# Patient Record
Sex: FEMALE | Race: WHITE | NOT HISPANIC OR LATINO | URBAN - METROPOLITAN AREA
[De-identification: names, ages, dates, MRNs, and addresses within clinical notes are randomized per-mention and may not be internally consistent; named-entity substitution may affect disease eponyms.]

---

## 2021-02-22 ENCOUNTER — LAB OUTSIDE AN ENCOUNTER (OUTPATIENT)
Dept: URBAN - METROPOLITAN AREA CLINIC 70 | Facility: CLINIC | Age: 41
End: 2021-02-22

## 2021-02-22 ENCOUNTER — OFFICE VISIT (OUTPATIENT)
Dept: URBAN - METROPOLITAN AREA CLINIC 70 | Facility: CLINIC | Age: 41
End: 2021-02-22
Payer: MEDICAID

## 2021-02-22 DIAGNOSIS — K58.1 IRRITABLE BOWEL SYNDROME WITH CONSTIPATION: ICD-10-CM

## 2021-02-22 DIAGNOSIS — K50.90 CROHN'S DISEASE, UNSPECIFIED, WITHOUT COMPLICATIONS: ICD-10-CM

## 2021-02-22 DIAGNOSIS — R10.84 GENERALIZED ABDOMINAL PAIN: ICD-10-CM

## 2021-02-22 PROCEDURE — 99203 OFFICE O/P NEW LOW 30 MIN: CPT | Performed by: REGISTERED NURSE

## 2021-02-22 RX ORDER — LISINOPRIL 10 MG/1
TABLET ORAL
Qty: 0 | Refills: 0 | Status: DISCONTINUED | COMMUNITY
Start: 2016-09-09

## 2021-02-22 RX ORDER — NORTRIPTYLINE HYDROCHLORIDE 25 MG/1
TAKE 2 CAPSULES (50 MG) BY ORAL ROUTE ONCE DAILY AT BEDTIME CAPSULE ORAL 1
Qty: 0 | Refills: 0 | Status: DISCONTINUED | COMMUNITY
Start: 1900-01-01

## 2021-02-22 RX ORDER — CLONAZEPAM 0.5 MG/1
1 PO QD TABLET ORAL
Qty: 0 | Refills: 0 | Status: DISCONTINUED | COMMUNITY
Start: 1900-01-01

## 2021-02-22 RX ORDER — CHLORHEXIDINE GLUCONATE 4 %
LIQUID (ML) TOPICAL
Qty: 0 | Refills: 0 | Status: DISCONTINUED | COMMUNITY
Start: 1900-01-01

## 2021-02-22 RX ORDER — ALPRAZOLAM 0.5 MG/1
1 TABLET TABLET ORAL TWICE A DAY
Status: ACTIVE | COMMUNITY

## 2021-02-22 RX ORDER — MECLIZINE HYDROCHLORIDE 25 MG/1
TABLET ORAL
Qty: 0 | Refills: 0 | Status: ACTIVE | COMMUNITY
Start: 2016-07-21

## 2021-02-22 RX ORDER — AMLODIPINE BESYLATE 5 MG/1
1 TABLET TABLET ORAL ONCE A DAY
Status: ACTIVE | COMMUNITY

## 2021-02-22 RX ORDER — VENLAFAXINE HYDROCHLORIDE 225 MG/1
1 CAPSULE WITH FOOD TABLET ORAL ONCE A DAY
Refills: 0 | Status: ACTIVE | COMMUNITY
Start: 1900-01-01

## 2021-02-22 RX ORDER — MONTELUKAST SODIUM 10 MG/1
1 TABLET TABLET, FILM COATED ORAL ONCE A DAY
Status: ACTIVE | COMMUNITY

## 2021-02-22 RX ORDER — CETIRIZINE HYDROCHLORIDE 10 MG/1
1 TABLET TABLET, FILM COATED ORAL ONCE A DAY
Status: ACTIVE | COMMUNITY

## 2021-02-22 RX ORDER — GABAPENTIN 300 MG/1
TAKE 1 CAPSULE (300 MG) BY ORAL ROUTE 2 TIMES PER DAY CAPSULE ORAL
Qty: 0 | Refills: 0 | Status: DISCONTINUED | COMMUNITY
Start: 1900-01-01

## 2021-02-22 RX ORDER — PROMETHAZINE HYDROCHLORIDE 25 MG/1
1 TABLET AS NEEDED TABLET ORAL
Status: ACTIVE | COMMUNITY

## 2021-02-22 RX ORDER — PROPRANOLOL HYDROCHLORIDE 40 MG/1
1 TABLET TABLET ORAL TWICE A DAY
Refills: 0 | Status: DISCONTINUED | COMMUNITY
Start: 1900-01-01

## 2021-02-22 RX ORDER — PREDNISONE 5 MG/1
TAKE 1 TABLET (5 MG) BY ORAL ROUTE ONCE DAILY TABLET ORAL 1
Qty: 0 | Refills: 0 | Status: DISCONTINUED | COMMUNITY
Start: 1900-01-01

## 2021-02-22 RX ORDER — TRAMADOL HYDROCHLORIDE 50 MG/1
1 TABLET AS NEEDED TABLET, FILM COATED ORAL ONCE A DAY
Status: ACTIVE | COMMUNITY

## 2021-02-22 RX ORDER — LUBIPROSTONE 8 UG/1
1 CAPSULE WITH FOOD AND WATER CAPSULE, GELATIN COATED ORAL TWICE A DAY
OUTPATIENT
Start: 2021-02-22

## 2021-02-22 RX ORDER — SUCRALFATE 1 G/1
TAKE 1 TABLET (1 GRAM) BY ORAL ROUTE 2 TIMES PER DAY ON AN EMPTY STOMACH FOR 30 DAYS TABLET ORAL 2
Qty: 60 | Refills: 4 | Status: DISCONTINUED | COMMUNITY
Start: 2018-01-02

## 2021-02-22 RX ORDER — FERROUS SULFATE 325(65) MG
1 TABLET TABLET ORAL ONCE A DAY
Status: ACTIVE | COMMUNITY

## 2021-02-22 RX ORDER — HYDROCHLOROTHIAZIDE 25 MG/1
1 CAPSULE IN THE MORNING TABLET ORAL ONCE A DAY
Refills: 0 | Status: ACTIVE | COMMUNITY
Start: 2016-09-09

## 2021-02-22 RX ORDER — PRAVASTATIN SODIUM 20 MG/1
1 TABLET TABLET ORAL ONCE A DAY
Status: ACTIVE | COMMUNITY

## 2021-02-22 RX ORDER — ROPINIROLE 4 MG/1
1 TABLET 1 TO 3 HOURS BEFORE BEDTIME TABLET, FILM COATED ORAL ONCE A DAY
Status: ACTIVE | COMMUNITY

## 2021-02-22 RX ORDER — BUDESONIDE 3 MG/1
TAKE 3 CAPSULES (9 MG) BY ORAL ROUTE ONCE DAILY IN THE MORNING CAPSULE ORAL 1
Qty: 90 | Refills: 3 | Status: DISCONTINUED | COMMUNITY
Start: 2018-01-02

## 2021-02-22 RX ORDER — POTASSIUM CHLORIDE 14.9 G/1000ML
AS DIRECTED INJECTION, SOLUTION INTRAVENOUS
Status: DISCONTINUED | COMMUNITY

## 2021-02-22 RX ORDER — BUTALBITAL, ACETAMINOPHEN, AND CAFFEINE 50; 300; 40 MG/1; MG/1; MG/1
1 CAPSULE AS NEEDED CAPSULE ORAL
Status: ACTIVE | COMMUNITY

## 2021-02-22 RX ORDER — MIRTAZAPINE 15 MG/1
1 TABLET AT BEDTIME TABLET, FILM COATED ORAL ONCE A DAY
Status: ACTIVE | COMMUNITY

## 2021-02-22 RX ORDER — TIZANIDINE HYDROCHLORIDE 4 MG/1
1-2 PO QHS CAPSULE ORAL
Qty: 0 | Refills: 0 | Status: ACTIVE | COMMUNITY
Start: 1900-01-01

## 2021-02-22 RX ORDER — DICYCLOMINE HYDROCHLORIDE 20 MG/1
1 TABLET TABLET ORAL THREE TIMES A DAY
Status: ACTIVE | COMMUNITY

## 2021-02-22 RX ORDER — PHENAZOPYRIDINE HYDROCHLORIDE 100 MG/1
TAKE 1 CAPSULE (60 MG) BY ORAL ROUTE ONCE DAILY TABLET, COATED ORAL 1
Qty: 0 | Refills: 0 | Status: DISCONTINUED | COMMUNITY
Start: 1900-01-01

## 2021-02-22 RX ORDER — OMEPRAZOLE 40 MG/1
TAKE 1 CAPSULE (40 MG) BY ORAL ROUTE ONCE DAILY BEFORE A MEAL FOR 60 DAYS CAPSULE, DELAYED RELEASE PELLETS ORAL 1
Qty: 60 | Refills: 3 | Status: ACTIVE | COMMUNITY
Start: 2018-01-02

## 2021-02-22 NOTE — HPI-TODAY'S VISIT:
Pt was diagnosed with Crohn's in 2016. She had mild inflammation in the transverse colon. IBD panel indicated Crohn's. Since that time, she was treated with Apriso, Humira, and Entocort. She has continued to c/o alternating constipation and diarrhea along with abdominal pain and cramping. Repeat colonoscopy in 2017 showed normal colon mucosa and normal TI. She was last seen by us in 2017. She was seen at [place] after her insurance changed and we were not on her plan. She reports that they did another colonoscopy and a small bowel pill cam and found no signs of inflammation. They stopped the Humira.  She presents today with c/o constipation alternating with diarrhea. She will go 2-3 weeks with no BM and then have severe diarrhea. She will end up taking Immodium. She c/o generalized abdominal pain, cramping, and nausea. Has tried Miralax in the past with minimal improvement. Linzess caused severe diarrhea.

## 2021-03-15 ENCOUNTER — WEB ENCOUNTER (OUTPATIENT)
Dept: URBAN - METROPOLITAN AREA CLINIC 70 | Facility: CLINIC | Age: 41
End: 2021-03-15

## 2021-03-15 ENCOUNTER — OFFICE VISIT (OUTPATIENT)
Dept: URBAN - METROPOLITAN AREA CLINIC 70 | Facility: CLINIC | Age: 41
End: 2021-03-15
Payer: MEDICAID

## 2021-03-15 VITALS
HEART RATE: 77 BPM | HEIGHT: 64 IN | WEIGHT: 238 LBS | TEMPERATURE: 97.3 F | SYSTOLIC BLOOD PRESSURE: 126 MMHG | DIASTOLIC BLOOD PRESSURE: 89 MMHG | BODY MASS INDEX: 40.63 KG/M2

## 2021-03-15 DIAGNOSIS — R10.84 GENERALIZED ABDOMINAL PAIN: ICD-10-CM

## 2021-03-15 DIAGNOSIS — K50.90 CROHN'S DISEASE, UNSPECIFIED, WITHOUT COMPLICATIONS: ICD-10-CM

## 2021-03-15 DIAGNOSIS — K59.04 CHRONIC IDIOPATHIC CONSTIPATION: ICD-10-CM

## 2021-03-15 PROBLEM — 34000006 CROHN'S DISEASE: Status: ACTIVE | Noted: 2021-02-22

## 2021-03-15 LAB
A/G RATIO: 1.3
ALBUMIN: 3.8
ALKALINE PHOSPHATASE: 147
ALT (SGPT): 9
AST (SGOT): 10
BILIRUBIN, TOTAL: 0.2
BUN/CREATININE RATIO: 16
BUN: 15
CALCIUM: 8.8
CALPROTECTIN, FECAL: 46
CARBON DIOXIDE, TOTAL: 24
CHLORIDE: 103
CREATININE: 0.93
EGFR IF AFRICN AM: 88
EGFR IF NONAFRICN AM: 76
GLOBULIN, TOTAL: 2.9
GLUCOSE: 94
HEMATOCRIT: 42.3
HEMOGLOBIN: 13.9
MCH: 29.6
MCHC: 32.9
MCV: 90
NRBC: (no result)
PLATELETS: 361
POTASSIUM: 3.8
PROTEIN, TOTAL: 6.7
RBC: 4.69
RDW: 13.5
SODIUM: 141
WBC: 15.5

## 2021-03-15 PROCEDURE — 99214 OFFICE O/P EST MOD 30 MIN: CPT | Performed by: REGISTERED NURSE

## 2021-03-15 RX ORDER — LUBIPROSTONE 24 UG/1
1 CAPSULE WITH FOOD AND WATER CAPSULE, GELATIN COATED ORAL TWICE A DAY
Qty: 60 | Refills: 11 | OUTPATIENT
Start: 2021-03-15 | End: 2022-03-09

## 2021-03-15 RX ORDER — CETIRIZINE HYDROCHLORIDE 10 MG/1
1 TABLET TABLET, FILM COATED ORAL ONCE A DAY
Status: ACTIVE | COMMUNITY

## 2021-03-15 RX ORDER — OMEPRAZOLE 40 MG/1
TAKE 1 CAPSULE (40 MG) BY ORAL ROUTE ONCE DAILY BEFORE A MEAL FOR 60 DAYS CAPSULE, DELAYED RELEASE PELLETS ORAL 1
Qty: 60 | Refills: 3 | Status: ACTIVE | COMMUNITY
Start: 2018-01-02

## 2021-03-15 RX ORDER — AMLODIPINE BESYLATE 5 MG/1
1 TABLET TABLET ORAL ONCE A DAY
Status: ACTIVE | COMMUNITY

## 2021-03-15 RX ORDER — LUBIPROSTONE 8 UG/1
1 CAPSULE WITH FOOD AND WATER CAPSULE, GELATIN COATED ORAL TWICE A DAY
Status: ACTIVE | COMMUNITY
Start: 2021-02-22

## 2021-03-15 RX ORDER — MECLIZINE HYDROCHLORIDE 25 MG/1
TABLET ORAL
Qty: 0 | Refills: 0 | Status: ACTIVE | COMMUNITY
Start: 2016-07-21

## 2021-03-15 RX ORDER — TRAMADOL HYDROCHLORIDE 50 MG/1
1 TABLET AS NEEDED TABLET, FILM COATED ORAL ONCE A DAY
Status: ACTIVE | COMMUNITY

## 2021-03-15 RX ORDER — ALPRAZOLAM 0.5 MG/1
1 TABLET TABLET ORAL TWICE A DAY
Status: ACTIVE | COMMUNITY

## 2021-03-15 RX ORDER — MIRTAZAPINE 15 MG/1
1 TABLET AT BEDTIME TABLET, FILM COATED ORAL ONCE A DAY
Status: ACTIVE | COMMUNITY

## 2021-03-15 RX ORDER — HYDROCHLOROTHIAZIDE 25 MG/1
1 CAPSULE IN THE MORNING TABLET ORAL ONCE A DAY
Refills: 0 | Status: ACTIVE | COMMUNITY
Start: 2016-09-09

## 2021-03-15 RX ORDER — MONTELUKAST SODIUM 10 MG/1
1 TABLET TABLET, FILM COATED ORAL ONCE A DAY
Status: ACTIVE | COMMUNITY

## 2021-03-15 RX ORDER — DICYCLOMINE HYDROCHLORIDE 20 MG/1
1 TABLET TABLET ORAL THREE TIMES A DAY
Status: ACTIVE | COMMUNITY

## 2021-03-15 RX ORDER — LUBIPROSTONE 8 UG/1
1 CAPSULE WITH FOOD AND WATER CAPSULE, GELATIN COATED ORAL TWICE A DAY
OUTPATIENT
Start: 2021-02-22

## 2021-03-15 RX ORDER — PRAVASTATIN SODIUM 20 MG/1
1 TABLET TABLET ORAL ONCE A DAY
Status: ACTIVE | COMMUNITY

## 2021-03-15 RX ORDER — TIZANIDINE HYDROCHLORIDE 4 MG/1
1-2 PO QHS CAPSULE ORAL
Qty: 0 | Refills: 0 | Status: ACTIVE | COMMUNITY
Start: 1900-01-01

## 2021-03-15 RX ORDER — FERROUS SULFATE 325(65) MG
1 TABLET TABLET ORAL ONCE A DAY
Status: ACTIVE | COMMUNITY

## 2021-03-15 RX ORDER — ROPINIROLE 4 MG/1
1 TABLET 1 TO 3 HOURS BEFORE BEDTIME TABLET, FILM COATED ORAL ONCE A DAY
Status: ACTIVE | COMMUNITY

## 2021-03-15 RX ORDER — PROMETHAZINE HYDROCHLORIDE 25 MG/1
1 TABLET AS NEEDED TABLET ORAL
Status: ACTIVE | COMMUNITY

## 2021-03-15 RX ORDER — VENLAFAXINE HYDROCHLORIDE 225 MG/1
1 CAPSULE WITH FOOD TABLET ORAL ONCE A DAY
Refills: 0 | Status: ACTIVE | COMMUNITY
Start: 1900-01-01

## 2021-03-15 RX ORDER — BUTALBITAL, ACETAMINOPHEN, AND CAFFEINE 50; 300; 40 MG/1; MG/1; MG/1
1 CAPSULE AS NEEDED CAPSULE ORAL
Status: ACTIVE | COMMUNITY

## 2021-03-15 NOTE — HPI-TODAY'S VISIT:
Note from OV 2/22/21: Pt was diagnosed with Crohn's in 2016. She had mild inflammation in the transverse colon. IBD panel indicated Crohn's. Since that time, she was treated with Apriso, Humira, and Entocort. She has continued to c/o alternating constipation and diarrhea along with abdominal pain and cramping. Repeat colonoscopy in 2017 showed normal colon mucosa and normal TI. She was last seen by us in 2017. She was seen at [place] after her insurance changed and we were not on her plan. She reports that they did another colonoscopy and a small bowel pill cam and found no signs of inflammation. They stopped the Humira.  She presents today with c/o constipation alternating with diarrhea. She will go 2-3 weeks with no BM and then have severe diarrhea. She will end up taking Immodium. She c/o generalized abdominal pain, cramping, and nausea. Has tried Miralax in the past with minimal improvement. Linzess caused severe diarrhea. TODAY: She was prescribed Amitiza at last visit. Labs, stool studies and CT scan were ordered. Labs and stools study results are not available yet. CT scan showed no evidence of colitis. It does show a large ovarian cyst. Bowels are moving every 3-4 days with the Amitiza. We did not receive her records from [place].

## 2021-04-26 ENCOUNTER — OFFICE VISIT (OUTPATIENT)
Dept: URBAN - METROPOLITAN AREA CLINIC 70 | Facility: CLINIC | Age: 41
End: 2021-04-26

## 2021-04-26 RX ORDER — PROMETHAZINE HYDROCHLORIDE 25 MG/1
1 TABLET AS NEEDED TABLET ORAL
Status: ACTIVE | COMMUNITY

## 2021-04-26 RX ORDER — TIZANIDINE HYDROCHLORIDE 4 MG/1
1-2 PO QHS CAPSULE ORAL
Qty: 0 | Refills: 0 | Status: ACTIVE | COMMUNITY
Start: 1900-01-01

## 2021-04-26 RX ORDER — ROPINIROLE 4 MG/1
1 TABLET 1 TO 3 HOURS BEFORE BEDTIME TABLET, FILM COATED ORAL ONCE A DAY
Status: ACTIVE | COMMUNITY

## 2021-04-26 RX ORDER — BUTALBITAL, ACETAMINOPHEN, AND CAFFEINE 50; 300; 40 MG/1; MG/1; MG/1
1 CAPSULE AS NEEDED CAPSULE ORAL
Status: ACTIVE | COMMUNITY

## 2021-04-26 RX ORDER — VENLAFAXINE HYDROCHLORIDE 225 MG/1
1 CAPSULE WITH FOOD TABLET ORAL ONCE A DAY
Refills: 0 | Status: ACTIVE | COMMUNITY
Start: 1900-01-01

## 2021-04-26 RX ORDER — LUBIPROSTONE 24 UG/1
1 CAPSULE WITH FOOD AND WATER CAPSULE, GELATIN COATED ORAL TWICE A DAY
Qty: 60 | Refills: 11 | Status: ACTIVE | COMMUNITY
Start: 2021-03-15 | End: 2022-03-09

## 2021-04-26 RX ORDER — ALPRAZOLAM 0.5 MG/1
1 TABLET TABLET ORAL TWICE A DAY
Status: ACTIVE | COMMUNITY

## 2021-04-26 RX ORDER — MIRTAZAPINE 15 MG/1
1 TABLET AT BEDTIME TABLET, FILM COATED ORAL ONCE A DAY
Status: ACTIVE | COMMUNITY

## 2021-04-26 RX ORDER — CETIRIZINE HYDROCHLORIDE 10 MG/1
1 TABLET TABLET, FILM COATED ORAL ONCE A DAY
Status: ACTIVE | COMMUNITY

## 2021-04-26 RX ORDER — PRAVASTATIN SODIUM 20 MG/1
1 TABLET TABLET ORAL ONCE A DAY
Status: ACTIVE | COMMUNITY

## 2021-04-26 RX ORDER — FERROUS SULFATE 325(65) MG
1 TABLET TABLET ORAL ONCE A DAY
Status: ACTIVE | COMMUNITY

## 2021-04-26 RX ORDER — MECLIZINE HYDROCHLORIDE 25 MG/1
TABLET ORAL
Qty: 0 | Refills: 0 | Status: ACTIVE | COMMUNITY
Start: 2016-07-21

## 2021-04-26 RX ORDER — TRAMADOL HYDROCHLORIDE 50 MG/1
1 TABLET AS NEEDED TABLET, FILM COATED ORAL ONCE A DAY
Status: ACTIVE | COMMUNITY

## 2021-04-26 RX ORDER — OMEPRAZOLE 40 MG/1
TAKE 1 CAPSULE (40 MG) BY ORAL ROUTE ONCE DAILY BEFORE A MEAL FOR 60 DAYS CAPSULE, DELAYED RELEASE PELLETS ORAL 1
Qty: 60 | Refills: 3 | Status: ACTIVE | COMMUNITY
Start: 2018-01-02

## 2021-04-26 RX ORDER — HYDROCHLOROTHIAZIDE 25 MG/1
1 CAPSULE IN THE MORNING TABLET ORAL ONCE A DAY
Refills: 0 | Status: ACTIVE | COMMUNITY
Start: 2016-09-09

## 2021-04-26 RX ORDER — AMLODIPINE BESYLATE 5 MG/1
1 TABLET TABLET ORAL ONCE A DAY
Status: ACTIVE | COMMUNITY

## 2021-04-26 RX ORDER — DICYCLOMINE HYDROCHLORIDE 20 MG/1
1 TABLET TABLET ORAL THREE TIMES A DAY
Status: ACTIVE | COMMUNITY

## 2021-04-26 RX ORDER — MONTELUKAST SODIUM 10 MG/1
1 TABLET TABLET, FILM COATED ORAL ONCE A DAY
Status: ACTIVE | COMMUNITY

## 2021-05-20 ENCOUNTER — OFFICE VISIT (OUTPATIENT)
Dept: URBAN - METROPOLITAN AREA CLINIC 70 | Facility: CLINIC | Age: 41
End: 2021-05-20
Payer: MEDICAID

## 2021-05-20 ENCOUNTER — LAB OUTSIDE AN ENCOUNTER (OUTPATIENT)
Dept: URBAN - METROPOLITAN AREA CLINIC 70 | Facility: CLINIC | Age: 41
End: 2021-05-20

## 2021-05-20 VITALS
SYSTOLIC BLOOD PRESSURE: 116 MMHG | HEART RATE: 92 BPM | HEIGHT: 64 IN | BODY MASS INDEX: 40.8 KG/M2 | TEMPERATURE: 98.1 F | DIASTOLIC BLOOD PRESSURE: 75 MMHG | WEIGHT: 239 LBS

## 2021-05-20 DIAGNOSIS — R10.84 GENERALIZED ABDOMINAL PAIN: ICD-10-CM

## 2021-05-20 DIAGNOSIS — K58.1 IRRITABLE BOWEL SYNDROME WITH CONSTIPATION: ICD-10-CM

## 2021-05-20 PROCEDURE — 99214 OFFICE O/P EST MOD 30 MIN: CPT | Performed by: INTERNAL MEDICINE

## 2021-05-20 RX ORDER — PROMETHAZINE HYDROCHLORIDE 25 MG/1
1 TABLET AS NEEDED TABLET ORAL
Status: ACTIVE | COMMUNITY

## 2021-05-20 RX ORDER — OMEPRAZOLE 40 MG/1
TAKE 1 CAPSULE (40 MG) BY ORAL ROUTE ONCE DAILY BEFORE A MEAL FOR 60 DAYS CAPSULE, DELAYED RELEASE PELLETS ORAL 1
Qty: 60 | Refills: 3 | Status: ACTIVE | COMMUNITY
Start: 2018-01-02

## 2021-05-20 RX ORDER — HYDROCHLOROTHIAZIDE 25 MG/1
1 CAPSULE IN THE MORNING TABLET ORAL ONCE A DAY
Refills: 0 | Status: ACTIVE | COMMUNITY
Start: 2016-09-09

## 2021-05-20 RX ORDER — MIRTAZAPINE 15 MG/1
1 TABLET AT BEDTIME TABLET, FILM COATED ORAL ONCE A DAY
Status: ACTIVE | COMMUNITY

## 2021-05-20 RX ORDER — TIZANIDINE HYDROCHLORIDE 4 MG/1
1-2 PO QHS CAPSULE ORAL
Qty: 0 | Refills: 0 | Status: ACTIVE | COMMUNITY
Start: 1900-01-01

## 2021-05-20 RX ORDER — MECLIZINE HYDROCHLORIDE 25 MG/1
TABLET ORAL
Qty: 0 | Refills: 0 | Status: ACTIVE | COMMUNITY
Start: 2016-07-21

## 2021-05-20 RX ORDER — AMLODIPINE BESYLATE 5 MG/1
1 TABLET TABLET ORAL ONCE A DAY
Status: ACTIVE | COMMUNITY

## 2021-05-20 RX ORDER — LUBIPROSTONE 24 UG/1
1 CAPSULE WITH FOOD AND WATER CAPSULE, GELATIN COATED ORAL TWICE A DAY
OUTPATIENT
Start: 2021-03-15 | End: 2022-03-09

## 2021-05-20 RX ORDER — TRAMADOL HYDROCHLORIDE 50 MG/1
1 TABLET AS NEEDED TABLET, FILM COATED ORAL ONCE A DAY
Status: ACTIVE | COMMUNITY

## 2021-05-20 RX ORDER — LUBIPROSTONE 24 UG/1
1 CAPSULE WITH FOOD AND WATER CAPSULE, GELATIN COATED ORAL TWICE A DAY
Qty: 60 | Refills: 11 | Status: ACTIVE | COMMUNITY
Start: 2021-03-15 | End: 2022-03-09

## 2021-05-20 RX ORDER — CETIRIZINE HYDROCHLORIDE 10 MG/1
1 TABLET TABLET, FILM COATED ORAL ONCE A DAY
Status: ACTIVE | COMMUNITY

## 2021-05-20 RX ORDER — PLECANATIDE 3 MG/1
1 TABLET TABLET ORAL ONCE A DAY
Qty: 30 | OUTPATIENT
Start: 2021-05-20 | End: 2021-06-19

## 2021-05-20 RX ORDER — VENLAFAXINE HYDROCHLORIDE 225 MG/1
1 CAPSULE WITH FOOD TABLET ORAL ONCE A DAY
Refills: 0 | Status: ACTIVE | COMMUNITY
Start: 1900-01-01

## 2021-05-20 RX ORDER — BUTALBITAL, ACETAMINOPHEN, AND CAFFEINE 50; 300; 40 MG/1; MG/1; MG/1
1 CAPSULE AS NEEDED CAPSULE ORAL
Status: ACTIVE | COMMUNITY

## 2021-05-20 RX ORDER — FERROUS SULFATE 325(65) MG
1 TABLET TABLET ORAL ONCE A DAY
Status: ACTIVE | COMMUNITY

## 2021-05-20 RX ORDER — ROPINIROLE 4 MG/1
1 TABLET 1 TO 3 HOURS BEFORE BEDTIME TABLET, FILM COATED ORAL ONCE A DAY
Status: ACTIVE | COMMUNITY

## 2021-05-20 RX ORDER — MONTELUKAST SODIUM 10 MG/1
1 TABLET TABLET, FILM COATED ORAL ONCE A DAY
Status: ACTIVE | COMMUNITY

## 2021-05-20 RX ORDER — ALPRAZOLAM 0.5 MG/1
1 TABLET TABLET ORAL TWICE A DAY
Status: ACTIVE | COMMUNITY

## 2021-05-20 RX ORDER — DICYCLOMINE HYDROCHLORIDE 20 MG/1
1 TABLET TABLET ORAL THREE TIMES A DAY
Status: ACTIVE | COMMUNITY

## 2021-05-20 RX ORDER — PRAVASTATIN SODIUM 20 MG/1
1 TABLET TABLET ORAL ONCE A DAY
Status: ACTIVE | COMMUNITY

## 2021-05-20 NOTE — HPI-TODAY'S VISIT:
Note from OV 2/22/21: Pt was diagnosed with Crohn's in 2016. She had mild inflammation in the transverse colon. IBD panel indicated Crohn's. Since that time, she was treated with Apriso, Humira, and Entocort. She has continued to c/o alternating constipation and diarrhea along with abdominal pain and cramping. Repeat colonoscopy in 2017 showed normal colon mucosa and normal TI. She was last seen by us in 2017. She was seen at [place] after her insurance changed and we were not on her plan. She reports that they did another colonoscopy and a small bowel pill cam and found no signs of inflammation. They stopped the Humira.  She presents today with c/o constipation alternating with diarrhea. She will go 2-3 weeks with no BM and then have severe diarrhea. She will end up taking Immodium. She c/o generalized abdominal pain, cramping, and nausea. Has tried Miralax in the past with minimal improvement. Linzess caused severe diarrhea. Note from OV 3/15/21: She was prescribed Amitiza at last visit. Labs, stool studies and CT scan were ordered. Labs and stools study results are not available yet. CT scan showed no evidence of colitis. It does show a large ovarian cyst. Bowels are moving every 3-4 days with the Amitiza. We did not receive her records from [place].  TODAY: We received [place] records showing normal colonoscopy and pill cam in 2018. Recent CT was negative except for ovarian cyst. Stool calprotectin normal. She took the Amitiza for a few days and stopped it due to diarrhea. She continues to c/o no BM for 2-3 weeks followed by several days of severe diarrhea.

## 2021-07-19 ENCOUNTER — OFFICE VISIT (OUTPATIENT)
Dept: URBAN - METROPOLITAN AREA CLINIC 70 | Facility: CLINIC | Age: 41
End: 2021-07-19
Payer: MEDICAID

## 2021-07-19 VITALS
WEIGHT: 243 LBS | DIASTOLIC BLOOD PRESSURE: 82 MMHG | HEART RATE: 76 BPM | SYSTOLIC BLOOD PRESSURE: 125 MMHG | HEIGHT: 64 IN | BODY MASS INDEX: 41.48 KG/M2 | TEMPERATURE: 96.7 F

## 2021-07-19 DIAGNOSIS — K58.1 IRRITABLE BOWEL SYNDROME WITH CONSTIPATION: ICD-10-CM

## 2021-07-19 PROCEDURE — 99214 OFFICE O/P EST MOD 30 MIN: CPT | Performed by: REGISTERED NURSE

## 2021-07-19 RX ORDER — PROMETHAZINE HYDROCHLORIDE 25 MG/1
1 TABLET AS NEEDED TABLET ORAL
Status: ACTIVE | COMMUNITY

## 2021-07-19 RX ORDER — MONTELUKAST SODIUM 10 MG/1
1 TABLET TABLET, FILM COATED ORAL ONCE A DAY
Status: ACTIVE | COMMUNITY

## 2021-07-19 RX ORDER — OMEPRAZOLE 40 MG/1
TAKE 1 CAPSULE (40 MG) BY ORAL ROUTE ONCE DAILY BEFORE A MEAL FOR 60 DAYS CAPSULE, DELAYED RELEASE PELLETS ORAL 1
Qty: 60 | Refills: 3 | Status: ACTIVE | COMMUNITY
Start: 2018-01-02

## 2021-07-19 RX ORDER — ALPRAZOLAM 0.5 MG/1
1 TABLET TABLET ORAL TWICE A DAY
Status: ACTIVE | COMMUNITY

## 2021-07-19 RX ORDER — FERROUS SULFATE 325(65) MG
1 TABLET TABLET ORAL ONCE A DAY
Status: ACTIVE | COMMUNITY

## 2021-07-19 RX ORDER — TRAMADOL HYDROCHLORIDE 50 MG/1
1 TABLET AS NEEDED TABLET, FILM COATED ORAL ONCE A DAY
Status: ACTIVE | COMMUNITY

## 2021-07-19 RX ORDER — VENLAFAXINE HYDROCHLORIDE 225 MG/1
1 CAPSULE WITH FOOD TABLET ORAL ONCE A DAY
Refills: 0 | Status: ACTIVE | COMMUNITY
Start: 1900-01-01

## 2021-07-19 RX ORDER — MECLIZINE HYDROCHLORIDE 25 MG/1
TABLET ORAL
Qty: 0 | Refills: 0 | Status: ACTIVE | COMMUNITY
Start: 2016-07-21

## 2021-07-19 RX ORDER — ROPINIROLE 4 MG/1
1 TABLET 1 TO 3 HOURS BEFORE BEDTIME TABLET, FILM COATED ORAL ONCE A DAY
Status: ACTIVE | COMMUNITY

## 2021-07-19 RX ORDER — AMLODIPINE BESYLATE 5 MG/1
1 TABLET TABLET ORAL ONCE A DAY
Status: ACTIVE | COMMUNITY

## 2021-07-19 RX ORDER — DICYCLOMINE HYDROCHLORIDE 20 MG/1
1 TABLET TABLET ORAL THREE TIMES A DAY
Status: ACTIVE | COMMUNITY

## 2021-07-19 RX ORDER — PRAVASTATIN SODIUM 20 MG/1
1 TABLET TABLET ORAL ONCE A DAY
Status: ACTIVE | COMMUNITY

## 2021-07-19 RX ORDER — LACTULOSE 10 G/15ML
30ML SOLUTION ORAL
Qty: 1800 ML | Refills: 3 | OUTPATIENT
Start: 2021-07-19 | End: 2021-11-15

## 2021-07-19 RX ORDER — BUTALBITAL, ACETAMINOPHEN, AND CAFFEINE 50; 300; 40 MG/1; MG/1; MG/1
1 CAPSULE AS NEEDED CAPSULE ORAL
Status: ACTIVE | COMMUNITY

## 2021-07-19 RX ORDER — MIRTAZAPINE 15 MG/1
1 TABLET AT BEDTIME TABLET, FILM COATED ORAL ONCE A DAY
Status: ACTIVE | COMMUNITY

## 2021-07-19 RX ORDER — HYDROCHLOROTHIAZIDE 25 MG/1
1 CAPSULE IN THE MORNING TABLET ORAL ONCE A DAY
Refills: 0 | Status: ACTIVE | COMMUNITY
Start: 2016-09-09

## 2021-07-19 RX ORDER — TIZANIDINE HYDROCHLORIDE 4 MG/1
1-2 PO QHS CAPSULE ORAL
Qty: 0 | Refills: 0 | Status: ACTIVE | COMMUNITY
Start: 1900-01-01

## 2021-07-19 RX ORDER — CETIRIZINE HYDROCHLORIDE 10 MG/1
1 TABLET TABLET, FILM COATED ORAL ONCE A DAY
Status: ACTIVE | COMMUNITY

## 2021-07-19 NOTE — HPI-TODAY'S VISIT:
Note from OV 2/22/21: Pt was diagnosed with Crohn's in 2016. She had mild inflammation in the transverse colon. IBD panel indicated Crohn's. Since that time, she was treated with Apriso, Humira, and Entocort. She has continued to c/o alternating constipation and diarrhea along with abdominal pain and cramping. Repeat colonoscopy in 2017 showed normal colon mucosa and normal TI. She was last seen by us in 2017. She was seen at [place] after her insurance changed and we were not on her plan. She reports that they did another colonoscopy and a small bowel pill cam and found no signs of inflammation. They stopped the Humira.  She presents today with c/o constipation alternating with diarrhea. She will go 2-3 weeks with no BM and then have severe diarrhea. She will end up taking Immodium. She c/o generalized abdominal pain, cramping, and nausea. Has tried Miralax in the past with minimal improvement. Linzess caused severe diarrhea. ------------------------------- Note from OV 3/15/21: She was prescribed Amitiza at last visit. Labs, stool studies and CT scan were ordered. Labs and stools study results are not available yet. CT scan showed no evidence of colitis. It does show a large ovarian cyst. Bowels are moving every 3-4 days with the Amitiza. We did not receive her records from [place]. ---------------------------- Note from OV 5/21/21: We received [place] records showing normal colonoscopy and pill cam in 2018. Recent CT was negative except for ovarian cyst. Stool calprotectin normal. She took the Amitiza for a few days and stopped it due to diarrhea. She continues to c/o no BM for 2-3 weeks followed by several days of severe diarrhea. ----------------------------- TODAY: KUB 5/21/21 showed constipation.  Trulance helped but was not covered by insurance. Bowels were moving every 3-4 and she felt better. She is back to having a bowel movement every couple of weeks. She will have severe abdominal cramping followed by loose stools.

## 2021-08-30 ENCOUNTER — OFFICE VISIT (OUTPATIENT)
Dept: URBAN - METROPOLITAN AREA CLINIC 70 | Facility: CLINIC | Age: 41
End: 2021-08-30
Payer: MEDICAID

## 2021-08-30 DIAGNOSIS — R10.84 GENERALIZED ABDOMINAL PAIN: ICD-10-CM

## 2021-08-30 DIAGNOSIS — K58.1 IRRITABLE BOWEL SYNDROME WITH CONSTIPATION: ICD-10-CM

## 2021-08-30 PROBLEM — 440630006 IRRITABLE BOWEL SYNDROME CHARACTERIZED BY CONSTIPATION: Status: ACTIVE | Noted: 2021-02-22

## 2021-08-30 PROCEDURE — 99214 OFFICE O/P EST MOD 30 MIN: CPT | Performed by: INTERNAL MEDICINE

## 2021-08-30 RX ORDER — FERROUS SULFATE 325(65) MG
1 TABLET TABLET ORAL ONCE A DAY
Status: ACTIVE | COMMUNITY

## 2021-08-30 RX ORDER — VENLAFAXINE HYDROCHLORIDE 225 MG/1
1 CAPSULE WITH FOOD TABLET ORAL ONCE A DAY
Refills: 0 | Status: ACTIVE | COMMUNITY
Start: 1900-01-01

## 2021-08-30 RX ORDER — MECLIZINE HYDROCHLORIDE 25 MG/1
TABLET ORAL
Qty: 0 | Refills: 0 | Status: ACTIVE | COMMUNITY
Start: 2016-07-21

## 2021-08-30 RX ORDER — ROPINIROLE 4 MG/1
1 TABLET 1 TO 3 HOURS BEFORE BEDTIME TABLET, FILM COATED ORAL ONCE A DAY
Status: ACTIVE | COMMUNITY

## 2021-08-30 RX ORDER — OMEPRAZOLE 40 MG/1
TAKE 1 CAPSULE (40 MG) BY ORAL ROUTE ONCE DAILY BEFORE A MEAL FOR 60 DAYS CAPSULE, DELAYED RELEASE PELLETS ORAL 1
Qty: 60 | Refills: 3 | Status: ACTIVE | COMMUNITY
Start: 2018-01-02

## 2021-08-30 RX ORDER — AMLODIPINE BESYLATE 5 MG/1
1 TABLET TABLET ORAL ONCE A DAY
Status: ACTIVE | COMMUNITY

## 2021-08-30 RX ORDER — DICYCLOMINE HYDROCHLORIDE 20 MG/1
1 TABLET TABLET ORAL THREE TIMES A DAY
Status: ACTIVE | COMMUNITY

## 2021-08-30 RX ORDER — CETIRIZINE HYDROCHLORIDE 10 MG/1
1 TABLET TABLET, FILM COATED ORAL ONCE A DAY
Status: ACTIVE | COMMUNITY

## 2021-08-30 RX ORDER — LACTULOSE 10 G/15ML
30ML SOLUTION ORAL
Qty: 1800 ML | Refills: 3 | Status: ACTIVE | COMMUNITY
Start: 2021-07-19 | End: 2021-11-15

## 2021-08-30 RX ORDER — PROMETHAZINE HYDROCHLORIDE 25 MG/1
1 TABLET AS NEEDED TABLET ORAL
Status: ACTIVE | COMMUNITY

## 2021-08-30 RX ORDER — TIZANIDINE 2 MG/1
1-2 PO QHS TABLET ORAL
Qty: 0 | Refills: 0 | Status: ACTIVE | COMMUNITY
Start: 1900-01-01

## 2021-08-30 RX ORDER — TRAMADOL HYDROCHLORIDE 50 MG/1
1 TABLET AS NEEDED TABLET, FILM COATED ORAL ONCE A DAY
Status: ACTIVE | COMMUNITY

## 2021-08-30 RX ORDER — BUTALBITAL, ACETAMINOPHEN, AND CAFFEINE 50; 300; 40 MG/1; MG/1; MG/1
1 CAPSULE AS NEEDED CAPSULE ORAL
Status: ACTIVE | COMMUNITY

## 2021-08-30 RX ORDER — ALPRAZOLAM 0.5 MG/1
1 TABLET TABLET ORAL TWICE A DAY
Status: ACTIVE | COMMUNITY

## 2021-08-30 RX ORDER — HYDROXYZINE HYDROCHLORIDE 25 MG/1
1 TABLET AS NEEDED TABLET, FILM COATED ORAL
Status: ACTIVE | COMMUNITY

## 2021-08-30 RX ORDER — MONTELUKAST SODIUM 10 MG/1
1 TABLET TABLET, FILM COATED ORAL ONCE A DAY
Status: ACTIVE | COMMUNITY

## 2021-08-30 RX ORDER — PRAVASTATIN SODIUM 20 MG/1
1 TABLET TABLET ORAL ONCE A DAY
Status: ACTIVE | COMMUNITY

## 2021-08-30 RX ORDER — HYDROCHLOROTHIAZIDE 25 MG/1
1 CAPSULE IN THE MORNING TABLET ORAL ONCE A DAY
Refills: 0 | Status: ACTIVE | COMMUNITY
Start: 2016-09-09

## 2021-08-30 RX ORDER — MIRTAZAPINE 15 MG/1
1 TABLET AT BEDTIME TABLET, FILM COATED ORAL ONCE A DAY
Status: ACTIVE | COMMUNITY

## 2021-08-30 RX ORDER — LUBIPROSTONE 8 UG/1
1 CAPSULE WITH FOOD AND WATER CAPSULE, GELATIN COATED ORAL TWICE A DAY
Qty: 60 | Refills: 11 | OUTPATIENT
Start: 2021-08-30 | End: 2022-08-25

## 2021-08-30 NOTE — PHYSICAL EXAM CARDIOVASCULAR:
EXAM DESCRIPTION:  NO CHG FLUORO; FINGER LEFT



COMPLETED DATE/TIME:  3/15/2019 2:20 pm



REASON FOR STUDY:  HARDWARE REMOVAL LEFT 5TH / PINKY FINGER ASST W/ FLUORO IN OR S62.637A  DISP FX OF
 DISTAL PHALANX OF LEFT LITTLE FINGER, IN S66.126A  LACERAT FLXR MUSC/FASC/TEND R LIT FNGR AT WRS/HND
 L M24.542  CONTRACTURE, LEFT HAND



COMPARISON:  None.



FLUOROSCOPY TIME:  4 seconds

3 images saved to PACS.



TECHNIQUE:  Intra-operative images acquired during surgical procedure to evaluate progress.

NUMBER OF IMAGES: 3



LIMITATIONS:  None.



FINDINGS:  Images mild 5th distal phalanx with removal of orthopedic pins.



IMPRESSION:  IMAGE(S) OBTAINED DURING PROCEDURE.



COMMENT:  Quality :  Final reports for procedures using fluoroscopy that document radiation exp
osure indices, or exposure time and number of fluorographic images (if radiation exposure indices are
 not available)

Please consult full operative report of the attending physician for description of the procedure.



TECHNICAL DOCUMENTATION:  JOB ID:  [id number]

 2011 [employer]- All Rights Reserved



Reading location - IP/workstation name: [email or web address] no edema,  no murmurs,  regular rate and rhythm

## 2021-08-30 NOTE — HPI-TODAY'S VISIT:
Note from OV 2/22/21: Pt was diagnosed with Crohn's in 2016. She had mild inflammation in the transverse colon. IBD panel indicated Crohn's. Since that time, she was treated with Apriso, Humira, and Entocort. She has continued to c/o alternating constipation and diarrhea along with abdominal pain and cramping. Repeat colonoscopy in 2017 showed normal colon mucosa and normal TI. She was last seen by us in 2017. She was seen at [place] after her insurance changed and we were not on her plan. She reports that they did another colonoscopy and a small bowel pill cam and found no signs of inflammation. They stopped the Humira.  She presents today with c/o constipation alternating with diarrhea. She will go 2-3 weeks with no BM and then have severe diarrhea. She will end up taking Immodium. She c/o generalized abdominal pain, cramping, and nausea. Has tried Miralax in the past with minimal improvement. Linzess caused severe diarrhea. ------------------------------- Note from OV 3/15/21: She was prescribed Amitiza at last visit. Labs, stool studies and CT scan were ordered. Labs and stools study results are not available yet. CT scan showed no evidence of colitis. It does show a large ovarian cyst. Bowels are moving every 3-4 days with the Amitiza. We did not receive her records from [place]. ---------------------------- Note from OV 5/21/21: We received [place] records showing normal colonoscopy and pill cam in 2018. Recent CT was negative except for ovarian cyst. Stool calprotectin normal. She took the Amitiza for a few days and stopped it due to diarrhea. She continues to c/o no BM for 2-3 weeks followed by several days of severe diarrhea. ----------------------------- Note from OV 7/19/21: KUB 5/21/21 showed constipation.  Trulance helped but was not covered by insurance. Bowels were moving every 3-4 and she felt better. She is back to having a bowel movement every couple of weeks. She will have severe abdominal cramping followed by loose stools. ------------------------------ TODAY: Pt continues to c/o abdominal pain and alternating constipation and diarrhea. Trulance helps some but is not covered by insurance. Linzess caused diarrhea. Lactulose caused nausea. Miralax and fiber don't help.

## 2021-11-01 ENCOUNTER — OFFICE VISIT (OUTPATIENT)
Dept: URBAN - METROPOLITAN AREA CLINIC 70 | Facility: CLINIC | Age: 41
End: 2021-11-01

## 2021-11-01 RX ORDER — LUBIPROSTONE 8 UG/1
1 CAPSULE WITH FOOD AND WATER CAPSULE, GELATIN COATED ORAL TWICE A DAY
Qty: 60 | Refills: 11 | COMMUNITY
Start: 2021-08-30 | End: 2022-08-25

## 2021-11-01 RX ORDER — PROMETHAZINE HYDROCHLORIDE 25 MG/1
1 TABLET AS NEEDED TABLET ORAL
COMMUNITY

## 2021-11-01 RX ORDER — MECLIZINE HYDROCHLORIDE 25 MG/1
TABLET ORAL
Qty: 0 | Refills: 0 | COMMUNITY
Start: 2016-07-21

## 2021-11-01 RX ORDER — FERROUS SULFATE 325(65) MG
1 TABLET TABLET ORAL ONCE A DAY
COMMUNITY

## 2021-11-01 RX ORDER — TIZANIDINE 2 MG/1
1-2 PO QHS TABLET ORAL
Qty: 0 | Refills: 0 | COMMUNITY
Start: 1900-01-01

## 2021-11-01 RX ORDER — MONTELUKAST SODIUM 10 MG/1
1 TABLET TABLET, FILM COATED ORAL ONCE A DAY
COMMUNITY

## 2021-11-01 RX ORDER — VENLAFAXINE HYDROCHLORIDE 225 MG/1
1 CAPSULE WITH FOOD TABLET ORAL ONCE A DAY
Refills: 0 | COMMUNITY
Start: 1900-01-01

## 2021-11-01 RX ORDER — ALPRAZOLAM 0.5 MG/1
1 TABLET TABLET ORAL TWICE A DAY
COMMUNITY

## 2021-11-01 RX ORDER — ROPINIROLE 4 MG/1
1 TABLET 1 TO 3 HOURS BEFORE BEDTIME TABLET, FILM COATED ORAL ONCE A DAY
COMMUNITY

## 2021-11-01 RX ORDER — OMEPRAZOLE 40 MG/1
TAKE 1 CAPSULE (40 MG) BY ORAL ROUTE ONCE DAILY BEFORE A MEAL FOR 60 DAYS CAPSULE, DELAYED RELEASE PELLETS ORAL 1
Qty: 60 | Refills: 3 | COMMUNITY
Start: 2018-01-02

## 2021-11-01 RX ORDER — MIRTAZAPINE 15 MG/1
1 TABLET AT BEDTIME TABLET, FILM COATED ORAL ONCE A DAY
COMMUNITY

## 2021-11-01 RX ORDER — BUTALBITAL, ACETAMINOPHEN, AND CAFFEINE 50; 300; 40 MG/1; MG/1; MG/1
1 CAPSULE AS NEEDED CAPSULE ORAL
COMMUNITY

## 2021-11-01 RX ORDER — HYDROCHLOROTHIAZIDE 25 MG/1
1 CAPSULE IN THE MORNING TABLET ORAL ONCE A DAY
Refills: 0 | COMMUNITY
Start: 2016-09-09

## 2021-11-01 RX ORDER — AMLODIPINE BESYLATE 5 MG/1
1 TABLET TABLET ORAL ONCE A DAY
COMMUNITY

## 2021-11-01 RX ORDER — DICYCLOMINE HYDROCHLORIDE 20 MG/1
1 TABLET TABLET ORAL THREE TIMES A DAY
COMMUNITY

## 2021-11-01 RX ORDER — PRAVASTATIN SODIUM 20 MG/1
1 TABLET TABLET ORAL ONCE A DAY
COMMUNITY

## 2021-11-01 RX ORDER — TRAMADOL HYDROCHLORIDE 50 MG/1
1 TABLET AS NEEDED TABLET, FILM COATED ORAL ONCE A DAY
COMMUNITY

## 2021-11-01 RX ORDER — CETIRIZINE HYDROCHLORIDE 10 MG/1
1 TABLET TABLET, FILM COATED ORAL ONCE A DAY
COMMUNITY

## 2021-11-01 RX ORDER — HYDROXYZINE HYDROCHLORIDE 25 MG/1
1 TABLET AS NEEDED TABLET, FILM COATED ORAL
COMMUNITY

## 2021-11-01 RX ORDER — LACTULOSE 10 G/15ML
30ML SOLUTION ORAL
Qty: 1800 ML | Refills: 3 | COMMUNITY
Start: 2021-07-19 | End: 2021-11-15

## 2021-12-16 ENCOUNTER — DASHBOARD ENCOUNTERS (OUTPATIENT)
Age: 41
End: 2021-12-16

## 2021-12-16 ENCOUNTER — OFFICE VISIT (OUTPATIENT)
Dept: URBAN - METROPOLITAN AREA CLINIC 70 | Facility: CLINIC | Age: 41
End: 2021-12-16
Payer: MEDICAID

## 2021-12-16 VITALS
HEART RATE: 98 BPM | WEIGHT: 249.2 LBS | TEMPERATURE: 97.3 F | SYSTOLIC BLOOD PRESSURE: 145 MMHG | BODY MASS INDEX: 42.55 KG/M2 | HEIGHT: 64 IN | DIASTOLIC BLOOD PRESSURE: 92 MMHG

## 2021-12-16 DIAGNOSIS — K59.04 CHRONIC IDIOPATHIC CONSTIPATION: ICD-10-CM

## 2021-12-16 PROBLEM — 82934008 CHRONIC IDIOPATHIC CONSTIPATION: Status: ACTIVE | Noted: 2021-03-15

## 2021-12-16 PROCEDURE — 99214 OFFICE O/P EST MOD 30 MIN: CPT | Performed by: REGISTERED NURSE

## 2021-12-16 RX ORDER — BUTALBITAL, ACETAMINOPHEN, AND CAFFEINE 50; 300; 40 MG/1; MG/1; MG/1
1 CAPSULE AS NEEDED CAPSULE ORAL
Status: ACTIVE | COMMUNITY

## 2021-12-16 RX ORDER — OMEPRAZOLE 40 MG/1
TAKE 1 CAPSULE (40 MG) BY ORAL ROUTE ONCE DAILY BEFORE A MEAL FOR 60 DAYS CAPSULE, DELAYED RELEASE PELLETS ORAL 1
Qty: 60 | Refills: 3 | Status: ACTIVE | COMMUNITY
Start: 2018-01-02

## 2021-12-16 RX ORDER — LUBIPROSTONE 24 UG/1
1 CAPSULE WITH FOOD AND WATER CAPSULE, GELATIN COATED ORAL TWICE A DAY
Qty: 60 | OUTPATIENT
Start: 2021-12-16 | End: 2022-01-15

## 2021-12-16 RX ORDER — AMLODIPINE BESYLATE 5 MG/1
1 TABLET TABLET ORAL ONCE A DAY
Status: ACTIVE | COMMUNITY

## 2021-12-16 RX ORDER — ROPINIROLE 4 MG/1
1 TABLET 1 TO 3 HOURS BEFORE BEDTIME TABLET, FILM COATED ORAL ONCE A DAY
Status: ACTIVE | COMMUNITY

## 2021-12-16 RX ORDER — VENLAFAXINE HYDROCHLORIDE 225 MG/1
1 CAPSULE WITH FOOD TABLET ORAL ONCE A DAY
Refills: 0 | Status: ACTIVE | COMMUNITY
Start: 1900-01-01

## 2021-12-16 RX ORDER — PRAVASTATIN SODIUM 20 MG/1
1 TABLET TABLET ORAL ONCE A DAY
Status: ACTIVE | COMMUNITY

## 2021-12-16 RX ORDER — FERROUS SULFATE 325(65) MG
1 TABLET TABLET ORAL ONCE A DAY
Status: ACTIVE | COMMUNITY

## 2021-12-16 RX ORDER — HYDROCODONE BITARTRATE AND ACETAMINOPHEN 5; 325 MG/1; MG/1
1 TABLET AS NEEDED TABLET ORAL
Status: ACTIVE | COMMUNITY

## 2021-12-16 RX ORDER — HYDROCHLOROTHIAZIDE 25 MG/1
1 CAPSULE IN THE MORNING TABLET ORAL ONCE A DAY
Refills: 0 | Status: ACTIVE | COMMUNITY
Start: 2016-09-09

## 2021-12-16 RX ORDER — TIZANIDINE 2 MG/1
1-2 PO QHS TABLET ORAL
Qty: 0 | Refills: 0 | Status: ACTIVE | COMMUNITY
Start: 1900-01-01

## 2021-12-16 RX ORDER — MIRTAZAPINE 15 MG/1
1 TABLET AT BEDTIME TABLET, FILM COATED ORAL ONCE A DAY
Status: ACTIVE | COMMUNITY

## 2021-12-16 RX ORDER — LUBIPROSTONE 8 UG/1
1 CAPSULE WITH FOOD AND WATER CAPSULE, GELATIN COATED ORAL TWICE A DAY
Qty: 60 | Refills: 11 | Status: ACTIVE | COMMUNITY
Start: 2021-08-30 | End: 2022-08-25

## 2021-12-16 RX ORDER — CETIRIZINE HYDROCHLORIDE 10 MG/1
1 TABLET TABLET, FILM COATED ORAL ONCE A DAY
Status: ACTIVE | COMMUNITY

## 2021-12-16 RX ORDER — PREGABALIN 50 MG/1
1 CAPSULE CAPSULE ORAL TWICE A DAY
Status: ACTIVE | COMMUNITY

## 2021-12-16 RX ORDER — DICYCLOMINE HYDROCHLORIDE 20 MG/1
1 TABLET TABLET ORAL THREE TIMES A DAY
Status: ACTIVE | COMMUNITY

## 2021-12-16 RX ORDER — HYDROXYZINE HYDROCHLORIDE 25 MG/1
1 TABLET AS NEEDED TABLET, FILM COATED ORAL
Status: ACTIVE | COMMUNITY

## 2021-12-16 RX ORDER — TRAMADOL HYDROCHLORIDE 50 MG/1
1 TABLET AS NEEDED TABLET, FILM COATED ORAL ONCE A DAY
Status: DISCONTINUED | COMMUNITY

## 2021-12-16 RX ORDER — PROMETHAZINE HYDROCHLORIDE 25 MG/1
1 TABLET AS NEEDED TABLET ORAL
Status: ACTIVE | COMMUNITY

## 2021-12-16 RX ORDER — MECLIZINE HYDROCHLORIDE 25 MG/1
TABLET ORAL
Qty: 0 | Refills: 0 | Status: ACTIVE | COMMUNITY
Start: 2016-07-21

## 2021-12-16 RX ORDER — ALPRAZOLAM 0.5 MG/1
1 TABLET TABLET ORAL TWICE A DAY
Status: ACTIVE | COMMUNITY

## 2021-12-16 RX ORDER — MONTELUKAST SODIUM 10 MG/1
1 TABLET TABLET, FILM COATED ORAL ONCE A DAY
Status: ACTIVE | COMMUNITY

## 2021-12-16 NOTE — HPI-TODAY'S VISIT:
Note from OV 2/22/21: Pt was diagnosed with Crohn's in 2016. She had mild inflammation in the transverse colon. IBD panel indicated Crohn's. Since that time, she was treated with Apriso, Humira, and Entocort. She has continued to c/o alternating constipation and diarrhea along with abdominal pain and cramping. Repeat colonoscopy in 2017 showed normal colon mucosa and normal TI. She was last seen by us in 2017. She was seen at [place] after her insurance changed and we were not on her plan. She reports that they did another colonoscopy and a small bowel pill cam and found no signs of inflammation. They stopped the Humira.  She presents today with c/o constipation alternating with diarrhea. She will go 2-3 weeks with no BM and then have severe diarrhea. She will end up taking Immodium. She c/o generalized abdominal pain, cramping, and nausea. Has tried Miralax in the past with minimal improvement. Linzess caused severe diarrhea. ------------------------------- Note from OV 3/15/21: She was prescribed Amitiza at last visit. Labs, stool studies and CT scan were ordered. Labs and stools study results are not available yet. CT scan showed no evidence of colitis. It does show a large ovarian cyst. Bowels are moving every 3-4 days with the Amitiza. We did not receive her records from [place]. ---------------------------- Note from OV 5/21/21: We received [place] records showing normal colonoscopy and pill cam in 2018. Recent CT was negative except for ovarian cyst. Stool calprotectin normal. She took the Amitiza for a few days and stopped it due to diarrhea. She continues to c/o no BM for 2-3 weeks followed by several days of severe diarrhea. ----------------------------- Note from OV 7/19/21: KUB 5/21/21 showed constipation.  Trulance helped but was not covered by insurance. Bowels were moving every 3-4 and she felt better. She is back to having a bowel movement every couple of weeks. She will have severe abdominal cramping followed by loose stools. ------------------------------ Note from OV 8/30/21: Pt continues to c/o abdominal pain and alternating constipation and diarrhea. Trulance helps some but is not covered by insurance. Linzess caused diarrhea. Lactulose caused nausea. Miralax and fiber don't help. -------------------------- TODAY 12/16/21: Bowels are moving every 3-4 days. She will then have severe abdominal cramping and diarrhea. She did not get the Amitiza Rx. Says is was denied by insurance. She has tried Metamucil, Miralax, Dulcolax, and lactulose with no improvement.